# Patient Record
Sex: FEMALE | Race: WHITE | Employment: FULL TIME | URBAN - METROPOLITAN AREA
[De-identification: names, ages, dates, MRNs, and addresses within clinical notes are randomized per-mention and may not be internally consistent; named-entity substitution may affect disease eponyms.]

---

## 2020-12-03 ENCOUNTER — HOSPITAL ENCOUNTER (EMERGENCY)
Age: 60
Discharge: HOME | End: 2020-12-03
Attending: EMERGENCY MEDICINE
Payer: COMMERCIAL

## 2020-12-03 ENCOUNTER — APPOINTMENT (OUTPATIENT)
Dept: GENERAL RADIOLOGY | Age: 60
End: 2020-12-03
Attending: EMERGENCY MEDICINE
Payer: COMMERCIAL

## 2020-12-03 VITALS
RESPIRATION RATE: 19 BRPM | BODY MASS INDEX: 33.91 KG/M2 | SYSTOLIC BLOOD PRESSURE: 106 MMHG | WEIGHT: 211 LBS | OXYGEN SATURATION: 93 % | HEART RATE: 77 BPM | HEIGHT: 66 IN | TEMPERATURE: 98.4 F | DIASTOLIC BLOOD PRESSURE: 71 MMHG

## 2020-12-03 DIAGNOSIS — R07.2 CHEST PAIN, PRECORDIAL: ICD-10-CM

## 2020-12-03 LAB
ALBUMIN SERPL-MCNC: 3.4 G/DL (ref 3.5–5)
ALBUMIN/GLOB SERPL: 1.1 {RATIO} (ref 1.1–2.2)
ALP SERPL-CCNC: 42 U/L (ref 45–117)
ALT SERPL-CCNC: 26 U/L (ref 12–78)
ANION GAP SERPL CALC-SCNC: 5 MMOL/L (ref 5–15)
AST SERPL-CCNC: 24 U/L (ref 15–37)
BASOPHILS # BLD: 0.1 K/UL (ref 0–0.1)
BASOPHILS NFR BLD: 1 % (ref 0–1)
BILIRUB SERPL-MCNC: 0.5 MG/DL (ref 0.2–1)
BNP SERPL-MCNC: 338 PG/ML
BUN SERPL-MCNC: 27 MG/DL (ref 6–20)
BUN/CREAT SERPL: 39 (ref 12–20)
CALCIUM SERPL-MCNC: 8.9 MG/DL (ref 8.5–10.1)
CHLORIDE SERPL-SCNC: 107 MMOL/L (ref 97–108)
CO2 SERPL-SCNC: 27 MMOL/L (ref 21–32)
COMMENT, HOLDF: NORMAL
CREAT SERPL-MCNC: 0.7 MG/DL (ref 0.55–1.02)
DIFFERENTIAL METHOD BLD: ABNORMAL
EOSINOPHIL # BLD: 0.5 K/UL (ref 0–0.4)
EOSINOPHIL NFR BLD: 9 % (ref 0–7)
ERYTHROCYTE [DISTWIDTH] IN BLOOD BY AUTOMATED COUNT: 14.3 % (ref 11.5–14.5)
GLOBULIN SER CALC-MCNC: 3.2 G/DL (ref 2–4)
GLUCOSE BLD STRIP.AUTO-MCNC: 108 MG/DL (ref 65–100)
GLUCOSE BLD STRIP.AUTO-MCNC: 98 MG/DL (ref 65–100)
GLUCOSE SERPL-MCNC: 144 MG/DL (ref 65–100)
HCT VFR BLD AUTO: 45.8 % (ref 35–47)
HGB BLD-MCNC: 14.2 G/DL (ref 11.5–16)
IMM GRANULOCYTES # BLD AUTO: 0 K/UL (ref 0–0.04)
IMM GRANULOCYTES NFR BLD AUTO: 1 % (ref 0–0.5)
LYMPHOCYTES # BLD: 1.2 K/UL (ref 0.8–3.5)
LYMPHOCYTES NFR BLD: 21 % (ref 12–49)
MAGNESIUM SERPL-MCNC: 2 MG/DL (ref 1.6–2.4)
MCH RBC QN AUTO: 26.1 PG (ref 26–34)
MCHC RBC AUTO-ENTMCNC: 31 G/DL (ref 30–36.5)
MCV RBC AUTO: 84 FL (ref 80–99)
MONOCYTES # BLD: 0.4 K/UL (ref 0–1)
MONOCYTES NFR BLD: 7 % (ref 5–13)
NEUTS SEG # BLD: 3.4 K/UL (ref 1.8–8)
NEUTS SEG NFR BLD: 61 % (ref 32–75)
NRBC # BLD: 0 K/UL (ref 0–0.01)
NRBC BLD-RTO: 0 PER 100 WBC
PLATELET # BLD AUTO: 108 K/UL (ref 150–400)
PMV BLD AUTO: 12.3 FL (ref 8.9–12.9)
POTASSIUM SERPL-SCNC: 4.2 MMOL/L (ref 3.5–5.1)
PROT SERPL-MCNC: 6.6 G/DL (ref 6.4–8.2)
RBC # BLD AUTO: 5.45 M/UL (ref 3.8–5.2)
SAMPLES BEING HELD,HOLD: NORMAL
SERVICE CMNT-IMP: ABNORMAL
SERVICE CMNT-IMP: NORMAL
SODIUM SERPL-SCNC: 139 MMOL/L (ref 136–145)
TROPONIN I SERPL-MCNC: <0.05 NG/ML
WBC # BLD AUTO: 5.6 K/UL (ref 3.6–11)

## 2020-12-03 PROCEDURE — 83735 ASSAY OF MAGNESIUM: CPT

## 2020-12-03 PROCEDURE — 80053 COMPREHEN METABOLIC PANEL: CPT

## 2020-12-03 PROCEDURE — 77030004532 HC CATH ANGI DX IMP BSC -A: Performed by: SPECIALIST

## 2020-12-03 PROCEDURE — 93458 L HRT ARTERY/VENTRICLE ANGIO: CPT | Performed by: SPECIALIST

## 2020-12-03 PROCEDURE — C1760 CLOSURE DEV, VASC: HCPCS | Performed by: SPECIALIST

## 2020-12-03 PROCEDURE — 74011250636 HC RX REV CODE- 250/636: Performed by: SPECIALIST

## 2020-12-03 PROCEDURE — 99285 EMERGENCY DEPT VISIT HI MDM: CPT

## 2020-12-03 PROCEDURE — 74011000636 HC RX REV CODE- 636: Performed by: SPECIALIST

## 2020-12-03 PROCEDURE — 83880 ASSAY OF NATRIURETIC PEPTIDE: CPT

## 2020-12-03 PROCEDURE — 71046 X-RAY EXAM CHEST 2 VIEWS: CPT

## 2020-12-03 PROCEDURE — 93005 ELECTROCARDIOGRAM TRACING: CPT

## 2020-12-03 PROCEDURE — 82962 GLUCOSE BLOOD TEST: CPT

## 2020-12-03 PROCEDURE — 74011000250 HC RX REV CODE- 250: Performed by: SPECIALIST

## 2020-12-03 PROCEDURE — C1894 INTRO/SHEATH, NON-LASER: HCPCS | Performed by: SPECIALIST

## 2020-12-03 PROCEDURE — 99152 MOD SED SAME PHYS/QHP 5/>YRS: CPT | Performed by: SPECIALIST

## 2020-12-03 PROCEDURE — 85025 COMPLETE CBC W/AUTO DIFF WBC: CPT

## 2020-12-03 PROCEDURE — 77030003390 HC NDL ANGI MRTM -A: Performed by: SPECIALIST

## 2020-12-03 PROCEDURE — 84484 ASSAY OF TROPONIN QUANT: CPT

## 2020-12-03 PROCEDURE — 36415 COLL VENOUS BLD VENIPUNCTURE: CPT

## 2020-12-03 RX ORDER — HEPARIN SODIUM 200 [USP'U]/100ML
INJECTION, SOLUTION INTRAVENOUS
Status: COMPLETED | OUTPATIENT
Start: 2020-12-03 | End: 2020-12-03

## 2020-12-03 RX ORDER — LISINOPRIL 20 MG/1
20 TABLET ORAL DAILY
COMMUNITY

## 2020-12-03 RX ORDER — SODIUM CHLORIDE 9 MG/ML
75 INJECTION, SOLUTION INTRAVENOUS CONTINUOUS
Status: DISCONTINUED | OUTPATIENT
Start: 2020-12-03 | End: 2020-12-03 | Stop reason: HOSPADM

## 2020-12-03 RX ORDER — ASPIRIN 325 MG
325 TABLET ORAL DAILY
Status: DISCONTINUED | OUTPATIENT
Start: 2020-12-04 | End: 2020-12-03 | Stop reason: HOSPADM

## 2020-12-03 RX ORDER — SODIUM CHLORIDE 0.9 % (FLUSH) 0.9 %
5-40 SYRINGE (ML) INJECTION EVERY 8 HOURS
Status: DISCONTINUED | OUTPATIENT
Start: 2020-12-03 | End: 2020-12-03 | Stop reason: HOSPADM

## 2020-12-03 RX ORDER — MELATONIN
3000 DAILY
COMMUNITY

## 2020-12-03 RX ORDER — LIDOCAINE HYDROCHLORIDE 10 MG/ML
INJECTION INFILTRATION; PERINEURAL AS NEEDED
Status: DISCONTINUED | OUTPATIENT
Start: 2020-12-03 | End: 2020-12-03 | Stop reason: HOSPADM

## 2020-12-03 RX ORDER — METFORMIN HYDROCHLORIDE 1000 MG/1
1000 TABLET ORAL 2 TIMES DAILY
COMMUNITY

## 2020-12-03 RX ORDER — ASPIRIN 81 MG/1
81 TABLET ORAL DAILY
COMMUNITY

## 2020-12-03 RX ORDER — SODIUM CHLORIDE 0.9 % (FLUSH) 0.9 %
5-40 SYRINGE (ML) INJECTION AS NEEDED
Status: DISCONTINUED | OUTPATIENT
Start: 2020-12-03 | End: 2020-12-03 | Stop reason: HOSPADM

## 2020-12-03 RX ORDER — SODIUM CHLORIDE 9 MG/ML
125 INJECTION, SOLUTION INTRAVENOUS CONTINUOUS
Status: DISCONTINUED | OUTPATIENT
Start: 2020-12-03 | End: 2020-12-03 | Stop reason: HOSPADM

## 2020-12-03 RX ORDER — DULAGLUTIDE 1.5 MG/.5ML
1.5 INJECTION, SOLUTION SUBCUTANEOUS
COMMUNITY

## 2020-12-03 RX ORDER — MIDAZOLAM HYDROCHLORIDE 1 MG/ML
INJECTION, SOLUTION INTRAMUSCULAR; INTRAVENOUS AS NEEDED
Status: DISCONTINUED | OUTPATIENT
Start: 2020-12-03 | End: 2020-12-03 | Stop reason: HOSPADM

## 2020-12-03 RX ORDER — HYDROCORTISONE SODIUM SUCCINATE 100 MG/2ML
100 INJECTION, POWDER, FOR SOLUTION INTRAMUSCULAR; INTRAVENOUS
Status: DISCONTINUED | OUTPATIENT
Start: 2020-12-03 | End: 2020-12-03 | Stop reason: HOSPADM

## 2020-12-03 RX ORDER — ATORVASTATIN CALCIUM 40 MG/1
40 TABLET, FILM COATED ORAL DAILY
COMMUNITY

## 2020-12-03 RX ORDER — FENOFIBRATE 160 MG/1
160 TABLET ORAL DAILY
COMMUNITY

## 2020-12-03 RX ORDER — FENTANYL CITRATE 50 UG/ML
INJECTION, SOLUTION INTRAMUSCULAR; INTRAVENOUS AS NEEDED
Status: DISCONTINUED | OUTPATIENT
Start: 2020-12-03 | End: 2020-12-03 | Stop reason: HOSPADM

## 2020-12-03 RX ORDER — DIPHENHYDRAMINE HYDROCHLORIDE 50 MG/ML
25 INJECTION, SOLUTION INTRAMUSCULAR; INTRAVENOUS
Status: DISCONTINUED | OUTPATIENT
Start: 2020-12-03 | End: 2020-12-03 | Stop reason: HOSPADM

## 2020-12-03 RX ORDER — METOPROLOL TARTRATE 25 MG/1
25 TABLET, FILM COATED ORAL 2 TIMES DAILY
Qty: 60 TAB | Refills: 5 | Status: SHIPPED | OUTPATIENT
Start: 2020-12-03

## 2020-12-03 NOTE — PROCEDURES
Cath:  Obstructive 1VD:     LAD patent     LCx m20     RCA m100 ( with L->R collat, heavily calcified and seemingly aneurysmal occluded section). Preserved LVF     EF 70%     Mild inferobasal HK  No AVG/MR  RFA angioseal.    Med mgmt of mRCA . Will start metoprolol  No metformin x 48hrs.   F/U with Dr. Chi Medrano 12/11/20 @ 1pm.

## 2020-12-03 NOTE — ED PROVIDER NOTES
59-year-old female the history of hypertension, diabetes and hypercholesterolemia presents with a chief complaint of intermittent chest pain for the last 2 years. The pain is mostly with exertion. She is currently pain-free. She describes it as a tightness which does radiate to her neck. She denies any nausea, vomiting, shortness of breath, abdominal pain, GI or urinary symptoms. She has been seen by cardiology recently and and they informed her that she needed to have a cardiac catheterization. Past Medical History:   Diagnosis Date    Diabetes ([facility] [address]     Hypertension     Ill-defined condition     high cholesterol       History reviewed. No pertinent surgical history. History reviewed. No pertinent family history.     Social History     Socioeconomic History    Marital status: LEGALLY      Spouse name: Not on file    Number of children: Not on file    Years of education: Not on file    Highest education level: Not on file   Occupational History    Not on file   Social Needs    Financial resource strain: Not on file    Food insecurity     Worry: Not on file     Inability: Not on file    Transportation needs     Medical: Not on file     Non-medical: Not on file   Tobacco Use    Smoking status: Former Smoker    Smokeless tobacco: Never Used   Substance and Sexual Activity    Alcohol use: Never     Frequency: Never    Drug use: Not on file    Sexual activity: Not on file   Lifestyle    Physical activity     Days per week: Not on file     Minutes per session: Not on file    Stress: Not on file   Relationships    Social connections     Talks on phone: Not on file     Gets together: Not on file     Attends Anabaptist service: Not on file     Active member of club or organization: Not on file     Attends meetings of clubs or organizations: Not on file     Relationship status: Not on file    Intimate partner violence     Fear of current or ex partner: Not on file     Emotionally abused: Not on file     Physically abused: Not on file     Forced sexual activity: Not on file   Other Topics Concern    Not on file   Social History Narrative    Not on file         ALLERGIES: Patient has no known allergies. Review of Systems   Constitutional: Negative for fever. HENT: Negative for rhinorrhea. Respiratory: Negative for shortness of breath. Cardiovascular: Positive for chest pain. Gastrointestinal: Negative for abdominal pain. Genitourinary: Negative for dysuria. Musculoskeletal: Negative for back pain. Skin: Negative for wound. Neurological: Negative for headaches. Psychiatric/Behavioral: Negative for confusion. Vitals:    12/03/20 0856   BP: (!) 151/89   Pulse: 82   Resp: 16   Temp: 96.9 °F (36.1 °C)   SpO2: 99%   Weight: 95.7 kg (211 lb)   Height: 5' 6\" (1.676 m)            Physical Exam  Vitals signs and nursing note reviewed. Constitutional:       General: She is not in acute distress. Appearance: Normal appearance. She is well-developed. She is not ill-appearing, toxic-appearing or diaphoretic. HENT:      Head: Normocephalic and atraumatic. Eyes:      Extraocular Movements: Extraocular movements intact. Neck:      Musculoskeletal: Normal range of motion. Cardiovascular:      Rate and Rhythm: Normal rate and regular rhythm. Pulses: Normal pulses. Heart sounds: Normal heart sounds. Pulmonary:      Effort: Pulmonary effort is normal. No respiratory distress. Breath sounds: Normal breath sounds. No decreased breath sounds, wheezing, rhonchi or rales. Abdominal:      General: Bowel sounds are normal. There is no distension. Palpations: Abdomen is soft. Tenderness: There is no abdominal tenderness. Musculoskeletal: Normal range of motion. Skin:     General: Skin is warm and dry. Neurological:      Mental Status: She is alert and oriented to person, place, and time.    Psychiatric:         Mood and Affect: Mood normal. MDM  Number of Diagnoses or Management Options  Diagnosis management comments: EKG shows a sinus rhythm at a rate of 73, normal intervals, normal axis, no ST elevation or depression. 58-year-old female presents with intermittent chest pain concerning for stable angina. I spoke with the patient's cardiologist to agrees to admit her for cardiac catheterization. Labs and imaging unremarkable. Patient is agreeable to admission.          Procedures

## 2020-12-03 NOTE — PROGRESS NOTES
1120 Received patient from emergency room. Armband and allergies verbally confirmed with patient. Procedure explained and consents signed. Consents signed, groin prep done  1250 TRANSFER - OUT REPORT:    Verbal report given to freddie(name) on Govind Smith  being transferred to cath lab(unit) for routine progression of care       Report consisted of patients Situation, Background, Assessment and   Recommendations(SBAR). Information from the following report(s) Procedure Summary was reviewed with the receiving nurse. Lines:   Peripheral IV 12/03/20 Right Antecubital (Active)   Site Assessment Clean, dry, & intact 12/03/20 1225   Phlebitis Assessment 0 12/03/20 0919   Infiltration Assessment 0 12/03/20 0919   Dressing Status Clean, dry, & intact 12/03/20 0919   Dressing Type Tape;Transparent 12/03/20 0919   Hub Color/Line Status Pink;Flushed;Patent 12/03/20 0919   Action Taken Blood drawn 12/03/20 0919        Opportunity for questions and clarification was provided. Patient transported with:   Registered Nurse 1400 reviewed discharge instructions with pt and daughter both verbalized understanding   1450 pt sitting up at 40 degrees right groin site soft dry in tact preparing for discharge iv [id number] Copy of printed discharge instructions given to patient and  Daughter Patient escorted via wheelchair to entrance. daughter driving. Patient discharged into care of daughter. Smitha Saucedo

## 2020-12-03 NOTE — ED TRIAGE NOTES
Patient has been having CP on and off for 2 years. Pt saw her cardiologist and they want her admitted for a heart cath. Patient has not had CP since yesterday morning. Pt had blood work done outpatient yesterday. Patient describes her pain as in her chest and radiates up both sides of her jaw. Denies SOB or any other symptoms at this time.

## 2020-12-03 NOTE — PROGRESS NOTES
1:22 PM  TRANSFER - IN REPORT:    Verbal report received from Geoffrey Melara RN(name) on [facility]  being received from Cath lab(unit) for routine progression of care      Report consisted of patients Situation, Background, Assessment and   Recommendations(SBAR). Information from the following report(s) Procedure Summary was reviewed with the receiving nurse. Opportunity for questions and clarification was provided. Assessment completed upon patients arrival to unit and care assumed.

## 2020-12-03 NOTE — H&P
Date of Surgery Update:  Alex Vasquez was seen and examined. History and physical has been reviewed. The patient has been examined. There have been no significant clinical changes since the completion of the originally dated History and Physical.    Signed By: Rachael Roth MD     December 3, 2020 11:11 AM         Malissa Smith 1960   Office/Outpatient Visit  Visit Date: Wed, Dec 2, 2020 1:15 pm  Provider: Deb Cowan MD (Assistant: Kimberly Sheets, RN )  Location: [facility])[address] [address] [phone]    Electronically signed by Cristian Morrissey MD on  12/02/2020 02:11:40 PM                           Subjective:    CC: Ms. Kleber Vigil is a 56year old White female. She does not have a primary care physician. New Patient- Not seen in 6 years She presents today with a complaint of chest pain. She is here today following a transition of care from her primary care provider. She has a history of hypercholesterolemia and hypertension. HPI:           Regarding chest pain: It radiates to the left jaw and left upper back and right upper back. She characterizes the pain as tightness. located in upper chest - radiate to neck and jaw and radiates also to back. it is random. Notices when bends over the litterbox - pain in shoulders and neck. She climbs flights of steps at work w/o CP/discomfort. States when does something strenuous the chest discomfort will come on (yard work and leaf blowing did cause discomfort). At times the discomfort will wake from sleep. States 2 yrs ago while walking up hill she had pain then also but has not felt pain like that time. mentioned possible cardiac cath. does not think can walk on a treadmill d/t leg issue. Had discomfort this morning when walking into work today. Hypercholesterolemia: Current treatment includes Lipitor and Tricor and diet.             Regarding hypertension:  Type Primary Hypertension Currently, her treatment regimen consists of daily 81 mg aspirin and an ACE/ARB ( lisinopril ). Does have issues with her leg. Diabetic since age 41  ROS:   Discussed relevant lab and imaging studies along with the plan of treatment with the nurse. EYES:  Negative for blurred vision and eye pain. E/N/T:  Negative for epistaxis and hoarseness. CARDIOVASCULAR:  Please see HPI. RESPIRATORY:  Negative for cough and hemoptysis. GASTROINTESTINAL:  Negative for abdominal pain and dysphagia. MUSCULOSKELETAL:  Negative for arthralgias and back pain. NEUROLOGICAL:  Negative for headaches, paresthesias, and weakness. HEMATOLOGIC/LYMPHATIC:  Negative for easy bruising, bleeding, and lymphadenopathy. PSYCHIATRIC:  No symptoms reported. Past Medical History / Family History / Social History:     Last Reviewed on 2020 01:50 PM by Debra Pepe  Past Medical History:   Past Medical History:   Hypercholesterolemia  Hypertension  Diabetes  Torn minicus in knees     Past Cardiac Procedures:  Echocardiogram:  14 - Normal LV systolic function with an estimated ejection fraction of 65%. There is trace mitral regurgitation. There is mild tricuspid regurgitation. Nuclear Study:  Aruna Mann - 14 - Abnormal myocardial perfusion Tetrofosmin Myoview SPECT imaging showing a small area of of ischemia in the inferior wall of the left ventricle. LVEF of 64%. The interpretation of the study was confounded by breast attenuation. The EKG was abnormal.      PREVENTIVE HEALTH MAINTENANCE       INFLUENZA VACCINE: was last done 10/2020     Surgical History:   Surgical/Procedural History: Tonsillectomy  Left breast biopsy (benign)  Uterine Ablation  Irregular heartbeat     Family History:   Family History:   Father:  at age 77; Cause of death was Brain Aneurysm. Mother:  at age 80; Cause of death was Natural causes.       Social History:   Social History:   Occupation: ; Marital Status:    Children: 1 child     Tobacco/Alcohol/Supplements:   Last Reviewed on 12/02/2020 01:50 PM by Betzy Herrera  TOBACCO/ALCOHOL/SUPPLEMENTS   Tobacco: She has a past history of cigarette smoking; quit 1999. Alcohol: Non-drinker   Caffeine:  She admits to consuming caffeine via coffee ( Occassionally ). Substance Abuse History:   Last Reviewed on 12/02/2020 01:50 PM by Betzy Herrera  Substance Use/Abuse:   None     Mental Health History:   Last Reviewed on 12/02/2020 01:50 PM by Betzy Herrera    Communicable Diseases (eg STDs): Last Reviewed on 12/02/2020 01:50 PM by Betzy Herrera    Allergies:   Last Reviewed on 12/02/2020 01:50 PM by Betzy Herrera  No Known Allergies. Current Medications:   Last Reviewed on 12/02/2020 01:50 PM by Betzy Herrera  metFORMIN 1,000 mg oral tablet [1 po bid]  aspirin 81 mg oral tablet, delayed release (enteric coated) [1 po qd]  fenofibrate 160 mg oral tablet [take 1 tablet (160 mg) by oral route once daily]  Vitamin D3   atorvastatin 40 mg oral tablet [take 1/2 tablet (40 mg) by oral route once daily]  lisinopriL 20 mg oral tablet [take 1 tablet (20 mg) by oral route once daily]  empagliflozin 25 mg oral tablet [take 1 tablet (25 mg) by oral route once daily in the morning]  dulaglutide 1.5 mg/0.5 mL subcutaneous Pen Injector [inject 0.5 milliliter (1.5 mg) by subcutaneous route every 7 days]    Objective:    Vitals:     Current: 12/2/2020 1:49:05 PM  Ht:  5 ft, 6 in; Wt: 211 lbs;  BMI: 34. 1BP: 136/83 mm Hg (left arm, sitting);  P: 80 bpm (left arm (BP Cuff), sitting);  sCr: 0.74 mg/dL;  GFR: 92.64    Exams:   GENERAL:  Alert, oriented to person, place and time x3. EYES:  Normal lids without xanthelasma; conjunctiva unremarkable; no scleral icterus. HEENT:  Face symmetric, voice clear, extraocular muscles intact. NECK:  Supple. No bruits, No JVD. LUNGS:  Clear to auscultation. No rales, no wheezing.     CARDIAC:  Regular rate and rhythm. PMI not displaced. ABDOMEN:  Soft. Positive bowel sounds. Non-tender. MUSCULOSKELETAL:  Normal range of motion, strength and tone. EXTREMITIES:  No edema. Good muscle tone and strength. SKIN: No significant rashes or lesions; no suspicious moles. NEUROLOGICAL:  No focal deficits, cranial nerves II-XII are grossly intact. PSYCHIATRIC:  Appropriate affect and demeanor; normal speech pattern; grossly normal memory. Procedures:   Chest pain, unspecified    ECG INTERPRETATION: See scanned EKG for results. Assessment:     R07.9   Chest pain, unspecified     E78.0   Pure hypercholesterolemia     I10   Essential (primary) hypertension       ORDERS:     Procedures Ordered:     05205  Education and train for pt self-mgmt by qualified, nonphysician, ea 30 minutes; individual pt  (Send-Out)          51691  Electrocardiogram, routine with at least 12 leads; with interpretation and report  (In-House)          Other Orders:     PT155B  Queried Patient for Tobacco Use  (Send-Out)          ADMIT2  Transfer of Care to [facility]  (Send-Out)              Plan:     Chest pain, unspecified      Medication list has been reviewed. Continue current medications. Smoking Status:  Nonsmoker     Schedule a follow up appointment in 2 weeks. Admit to hospital at this point in time. See admission orders for further plan. Discussed with patient diet, exercise plan and lifestyle modifications. The above note was transcribed by Rl Membreno and authenticated by Dr. Milly Gonzalez prior to sign off.

## 2020-12-03 NOTE — PROGRESS NOTES
[facility]: MED RECONCILIATION      Medications added:   All- no previously listed medications    Information obtained from: Patient (alert, oriented, reliable), medication list, RxQuery (data available)      Allergies: Patient has no known allergies. Prior to Admission Medications:     Medication Documentation Review Audit       Reviewed by Rachana Delgado., PHARMD (Pharmacist) on 12/03/20 at 1125      Medication Sig Documenting Provider Last Dose Status Taking?   aspirin delayed-release 81 mg tablet Take 81 mg by mouth daily. Provider, Historical  Active Yes   atorvastatin (LIPITOR) 40 mg tablet Take 40 mg by mouth daily. Provider, Historical  Active Yes   cholecalciferol (Vitamin D3) (1000 Units /25 mcg) tablet Take 1,000 Units by mouth daily. Provider, Historical  Active Yes   dulaglutide (Trulicity) 1.5 OC/3.5 mL sub-q pen 1.5 mg by SubCUTAneous route every Sunday. Provider, Historical  Active Yes   empagliflozin (Jardiance) 25 mg tablet Take 25 mg by mouth daily. Provider, Historical  Active Yes   fenofibrate (LOFIBRA) 160 mg tablet Take 160 mg by mouth daily. Provider, Historical  Active Yes   lisinopriL (PRINIVIL, ZESTRIL) 20 mg tablet Take 20 mg by mouth daily. Provider, Historical  Active Yes   metFORMIN (GLUCOPHAGE) 1,000 mg tablet Take 1,000 mg by mouth two (2) times a day. Provider, Historical  Active Yes                    [place].  Lisseth Vaz

## 2020-12-03 NOTE — DISCHARGE INSTRUCTIONS
Discharge Instructions:                   Cardiac Catheterization/Angiography Discharge Instructions    *Check the puncture site frequently for swelling or bleeding. If you see any bleeding, lie down and apply pressure over the area with a clean town or washcloth. Call 911 immediately and continue to hold pressure until their arrival. Notify your doctor for any redness, swelling, drainage or oozing from the puncture site. Notify your doctor for any fever or chills. *If the leg  with the puncture becomes cold, numb or painful, call Dr Umair Brantley at  [phone]    *Activity should be limited for the next 48 hours. Climb stairs as little as possible and avoid any stooping, bending or strenuous activity for 48 hours. No heavy lifting (anything over 10 pounds) for three days. *Do not drive for 24 hours. *You may resume your usual diet. Drink more fluids than usual.    *Have a responsible person drive you home and stay with you for at least 24 hours after your heart catheterization/angiography. *You may remove the bandage from your Right Groin in 24 hours. You may shower in 24 hours. No tub baths, hot tubs or swimming for one week. Do not place any lotions, creams, powders, ointments over the puncture site for one week. You may place a clean band-aid over the puncture site each day for 5 days. Change this daily. Medications:     Do NOT restart metformin (glucophage) until Saturday, December 5th, 2020. Activity:     As tolerated except do not lift over 10 pounds for 5 days. Diet:     American Heart Association. Follow-up:     Follow up with Renetta Garcia MD on December 11th, 2020 at [address] [place] [address] 33  [phone]      If you smoke, STOP!

## 2020-12-06 LAB
ATRIAL RATE: 73 BPM
CALCULATED P AXIS, ECG09: 69 DEGREES
CALCULATED R AXIS, ECG10: 99 DEGREES
CALCULATED T AXIS, ECG11: 67 DEGREES
DIAGNOSIS, 93000: NORMAL
P-R INTERVAL, ECG05: 148 MS
Q-T INTERVAL, ECG07: 402 MS
QRS DURATION, ECG06: 96 MS
QTC CALCULATION (BEZET), ECG08: 442 MS
VENTRICULAR RATE, ECG03: 73 BPM

## 2025-06-06 ENCOUNTER — HOSPITAL ENCOUNTER (EMERGENCY)
Facility: HOSPITAL | Age: 65
Discharge: HOME | End: 2025-06-06
Attending: EMERGENCY MEDICINE
Payer: COMMERCIAL

## 2025-06-06 VITALS
HEART RATE: 78 BPM | OXYGEN SATURATION: 98 % | WEIGHT: 210 LBS | BODY MASS INDEX: 33.75 KG/M2 | TEMPERATURE: 98 F | HEIGHT: 66 IN | SYSTOLIC BLOOD PRESSURE: 174 MMHG | RESPIRATION RATE: 18 BRPM | DIASTOLIC BLOOD PRESSURE: 87 MMHG

## 2025-06-06 DIAGNOSIS — K64.4 EXTERNAL HEMORRHOID: Primary | ICD-10-CM

## 2025-06-06 PROCEDURE — 99283 EMERGENCY DEPT VISIT LOW MDM: CPT

## 2025-06-06 RX ORDER — ASPIRIN 81 MG/1
81 TABLET, CHEWABLE ORAL DAILY
COMMUNITY

## 2025-06-06 RX ORDER — DIBUCAINE 1% 1 G/100G
CREAM TOPICAL
Qty: 28 G | Refills: 0 | Status: SHIPPED | OUTPATIENT
Start: 2025-06-06 | End: 2025-06-16

## 2025-06-06 RX ORDER — AMLODIPINE BESYLATE 2.5 MG/1
2.5 TABLET ORAL DAILY
COMMUNITY

## 2025-06-06 RX ORDER — SERTRALINE HYDROCHLORIDE 25 MG/1
25 TABLET, FILM COATED ORAL DAILY
COMMUNITY

## 2025-06-06 RX ORDER — LISINOPRIL 20 MG/1
30 TABLET ORAL DAILY
COMMUNITY

## 2025-06-06 RX ORDER — ATORVASTATIN CALCIUM 40 MG/1
40 TABLET, FILM COATED ORAL DAILY
COMMUNITY

## 2025-06-06 RX ORDER — METOPROLOL SUCCINATE 25 MG/1
25 TABLET, EXTENDED RELEASE ORAL DAILY
COMMUNITY

## 2025-06-06 RX ORDER — ACETAMINOPHEN 160 MG
2000 TABLET,DISINTEGRATING ORAL DAILY
COMMUNITY

## 2025-06-06 RX ORDER — FENOFIBRATE 40 MG/1
80 TABLET ORAL DAILY
COMMUNITY

## 2025-06-06 ASSESSMENT — PAIN DESCRIPTION - ONSET: ONSET: AWAKENED FROM SLEEP

## 2025-06-06 ASSESSMENT — PAIN - FUNCTIONAL ASSESSMENT: PAIN_FUNCTIONAL_ASSESSMENT: ACTIVITIES ARE NOT PREVENTED

## 2025-06-06 ASSESSMENT — PAIN DESCRIPTION - LOCATION: LOCATION: RECTUM

## 2025-06-06 ASSESSMENT — PAIN DESCRIPTION - ORIENTATION: ORIENTATION: RIGHT

## 2025-06-06 ASSESSMENT — LIFESTYLE VARIABLES
HOW MANY STANDARD DRINKS CONTAINING ALCOHOL DO YOU HAVE ON A TYPICAL DAY: 1 OR 2
HOW OFTEN DO YOU HAVE A DRINK CONTAINING ALCOHOL: NEVER

## 2025-06-06 ASSESSMENT — PAIN DESCRIPTION - DESCRIPTORS: DESCRIPTORS: TENDER

## 2025-06-06 ASSESSMENT — PAIN DESCRIPTION - FREQUENCY: FREQUENCY: CONTINUOUS

## 2025-06-06 ASSESSMENT — PAIN DESCRIPTION - PAIN TYPE: TYPE: ACUTE PAIN

## 2025-06-06 ASSESSMENT — PAIN SCALES - GENERAL: PAINLEVEL_OUTOF10: 8

## 2025-06-06 NOTE — DISCHARGE INSTRUCTIONS
We discussed your results today. It is important for you to follow up with your primary care for further evaluation and management.  It is important for you to increase your fiber intake and drink lots of water.  Return to the emergency department for worsening symptoms.

## 2025-06-06 NOTE — ED TRIAGE NOTES
Pt ambulated to the treatment area with a steady gait accompanied by her daughter. Pt states \"I woke up this morning with a large prosper to the right side of my Rectum and it hurts.\"

## 2025-06-06 NOTE — ED PROVIDER NOTES
[place] EMERGENC[facility] DEPARTMENT  EMERGENCY DEPARTMENT ENCOUNTER      Pt Name: Teresita Piper  MRN: [id number]  Birthdate 1960  Date of evaluation: 6/6/2025  Provider: Shahbaz Randall PA-C    CHIEF COMPLAINT       Chief Complaint   Patient presents with    rectal lump         HISTORY OF PRESENT ILLNESS   (Location/Symptom, Timing/Onset, Context/Setting, Quality, Duration, Modifying Factors, Severity)  Note limiting factors.   Teresita Piper is a 65 y.o. female who presents to the emergency department for \"rectal lump\".  Patient noticed a lesion around her rectum earlier today.  She reports pain and described the pain as a increased pressure sensation.  Denies any abdominal pain, fever, chills, vomiting, urinary symptoms, diarrhea or blood in the stool.        The history is provided by the patient and a relative.         Review of External Medical Records:     Nursing Notes were reviewed.    REVIEW OF SYSTEMS    (2-9 systems for level 4, 10 or more for level 5)     Review of Systems    Except as noted above the remainder of the review of systems was reviewed and negative.       PAST MEDICAL HISTORY     Past Medical History:   Diagnosis Date    Diabetes (HCC)     Hyperlipidemia     Hypertension     Ill-defined condition     high cholesterol         SURGICAL HISTORY     History reviewed. No pertinent surgical history.      CURRENT MEDICATIONS       Previous Medications    AMLODIPINE (NORVASC) 2.5 MG TABLET    Take 1 tablet by mouth daily    ASPIRIN (ASPIRIN 81) 81 MG CHEWABLE TABLET    Take 1 tablet by mouth daily    ATORVASTATIN (LIPITOR) 40 MG TABLET    Take 1 tablet by mouth daily    FENOFIBRATE (TRICOR) 40 MG TABS TABLET    2 tablets by NOT APPLICABLE route daily    LISINOPRIL (PRINIVIL;ZESTRIL) 20 MG TABLET    Take 1.5 tablets by mouth daily    METFORMIN (GLUCOPHAGE) 1000 MG TABLET    Take 1 tablet by mouth 2 times daily (with meals)    METOPROLOL SUCCINATE (TOPROL XL) 25 MG EXTENDED RELEASE TABLET     tenderness, left CVA tenderness, guarding or rebound.   Genitourinary:     Comments: Chaperone present for rectal exam showed external hemorrhoid that is not thrombosed. No presence of fissures.   Musculoskeletal:      Cervical back: Normal range of motion.   Skin:     General: Skin is warm and dry.      Capillary Refill: Capillary refill takes less than 2 seconds.   Neurological:      General: No focal deficit present.      Mental Status: She is alert and oriented to person, place, and time.      Sensory: No sensory deficit.   Psychiatric:         Mood and Affect: Mood normal.         Behavior: Behavior normal.         DIAGNOSTIC RESULTS     EKG: All EKG's are interpreted by the Emergency Department Physician who either signs or Co-signs this chart in the absence of a cardiologist.        RADIOLOGY:   Non-plain film images such as CT, Ultrasound and MRI are read by the radiologist. Plain radiographic images are visualized and preliminarily interpreted by the emergency physician with the below findings:        Interpretation per the Radiologist below, if available at the time of this note:    No orders to display        LABS:  Labs Reviewed - No data to display    All other labs were within normal range or not returned as of this dictation.    EMERGENCY DEPARTMENT COURSE and DIFFERENTIAL DIAGNOSIS/MDM:   Vitals:    Vitals:    06/06/25 1400 06/06/25 1415   BP: (!) 205/97 (!) 174/87   Pulse: 78    Resp: 18    Temp: 98 °F (36.7 °C)    TempSrc: Oral    SpO2: 99% 98%   Weight: 95.3 kg (210 lb)    Height: 1.676 m (5' 6\")            Medical Decision Making  Teresita Piper is a 65 y.o. female who presents to the emergency department for \"rectal lump\".  Patient noticed a lesion around her rectum earlier today.  She reports pain and described the pain as a increased pressure sensation.  Denies any abdominal pain, fever, chills, vomiting, urinary symptoms, diarrhea or blood in the stool.   Vitals in triage are benign. Physical

## (undated) DEVICE — NEEDLE ANGIO 18GA L9CM NRML 1 WALL SMOOTH FINISH CLR HUB FOR

## (undated) DEVICE — ANGIO-SEAL VIP VASCULAR CLOSURE DEVICE: Brand: ANGIO-SEAL

## (undated) DEVICE — PACK PROCEDURE SURG HRT CATH

## (undated) DEVICE — PROCEDURE KIT FLUID MGMT CUST MAINFOLD STRL

## (undated) DEVICE — CATH DIAG-D 6F MULTI PIG 155 5 -- IMPULSE 16599-302

## (undated) DEVICE — PINNACLE INTRODUCER SHEATH: Brand: PINNACLE